# Patient Record
Sex: FEMALE | Race: WHITE | NOT HISPANIC OR LATINO | URBAN - METROPOLITAN AREA
[De-identification: names, ages, dates, MRNs, and addresses within clinical notes are randomized per-mention and may not be internally consistent; named-entity substitution may affect disease eponyms.]

---

## 2020-02-18 ENCOUNTER — APPOINTMENT (RX ONLY)
Dept: URBAN - METROPOLITAN AREA CLINIC 51 | Facility: CLINIC | Age: 55
Setting detail: DERMATOLOGY
End: 2020-02-18

## 2020-02-18 ENCOUNTER — APPOINTMENT (RX ONLY)
Dept: URBAN - METROPOLITAN AREA CLINIC 52 | Facility: CLINIC | Age: 55
Setting detail: DERMATOLOGY
End: 2020-02-18

## 2020-02-18 DIAGNOSIS — R21 RASH AND OTHER NONSPECIFIC SKIN ERUPTION: ICD-10-CM

## 2020-02-18 DIAGNOSIS — L29.89 OTHER PRURITUS: ICD-10-CM

## 2020-02-18 DIAGNOSIS — L29.8 OTHER PRURITUS: ICD-10-CM

## 2020-02-18 PROCEDURE — ? PRESCRIPTION

## 2020-02-18 PROCEDURE — 99202 OFFICE O/P NEW SF 15 MIN: CPT

## 2020-02-18 PROCEDURE — ? COUNSELING

## 2020-02-18 PROCEDURE — ? TREATMENT REGIMEN

## 2020-02-18 RX ORDER — TRIAMCINOLONE ACETONIDE 1 MG/G
CREAM TOPICAL BID
Qty: 45 | Refills: 1 | Status: ERX | COMMUNITY
Start: 2020-02-18

## 2020-02-18 RX ADMIN — TRIAMCINOLONE ACETONIDE: 1 CREAM TOPICAL at 00:00

## 2020-02-18 ASSESSMENT — LOCATION SIMPLE DESCRIPTION DERM
LOCATION SIMPLE: LEFT UPPER ARM
LOCATION SIMPLE: RIGHT UPPER ARM
LOCATION SIMPLE: RIGHT UPPER ARM
LOCATION SIMPLE: LEFT UPPER ARM

## 2020-02-18 ASSESSMENT — LOCATION ZONE DERM
LOCATION ZONE: ARM
LOCATION ZONE: ARM

## 2020-02-18 ASSESSMENT — LOCATION DETAILED DESCRIPTION DERM
LOCATION DETAILED: LEFT ANTERIOR PROXIMAL UPPER ARM
LOCATION DETAILED: LEFT ANTERIOR PROXIMAL UPPER ARM
LOCATION DETAILED: RIGHT ANTERIOR PROXIMAL UPPER ARM
LOCATION DETAILED: RIGHT ANTERIOR PROXIMAL UPPER ARM

## 2020-02-18 NOTE — HPI: RASH
What Type Of Note Output Would You Prefer (Optional)?: Standard Output
How Severe Is Your Rash?: severe
Is This A New Presentation, Or A Follow-Up?: Rash
Additional History: Patient states she went to previous dermatologist and saw MD albrecht. She did various tests such as biopsy, and report came back as atopic dermatitis. She has tried Different steroids, (IL, PO,and topiclal) with no help. Patient was also tested for scabies and was negative. Patient reports she was told she was allaergic to blue  dye, but states it it’s not true. She reports MD albrecht wanted to do patch testing. She states she cannot take warm baths. If her body temperature gets too hot, her itching initiates and flares.  Patient currently takes Zyrtec in AM and Benadryl at night if flares get extreme. Patient emphasizes she works in [employer] and about 1 year ago, 10 people were found to have the same kind of reaction that she has. Patient states “it feels like extreme bug bites and burns”.  Patient states she has had a colonoscopy and when she started cleansing, the area started fading. There is no rash present today, but patient states area is still itchy.

## 2020-02-18 NOTE — HPI: RASH
What Type Of Note Output Would You Prefer (Optional)?: Standard Output
How Severe Is Your Rash?: severe
Is This A New Presentation, Or A Follow-Up?: Rash
Additional History: Patient states she went to previous dermatologist and saw MD jeffries. She did various tests such as biopsy, and report came back as atopic dermatitis. She has tried Different steroids, (IL, PO,and topiclal) with no help. Patient was also tested for scabies and was negative. Patient reports she was told she was allaergic to blue  dye, but states it it’s not true. She reports MD jeffries wanted to do patch testing. She states she cannot take warm baths. If her body temperature gets too hot, her itching initiates and flares.  Patient currently takes Zyrtec in AM and Benadryl at night if flares get extreme. Patient emphasizes she works in [employer] and about 1 year ago, 10 people were found to have the same kind of reaction that she has. Patient states “it feels like extreme bug bites and burns”.  Patient states she has had a colonoscopy and when she started cleansing, the area started fading. There is no rash present today, but patient states area is still itchy.